# Patient Record
Sex: MALE | Race: WHITE | URBAN - METROPOLITAN AREA
[De-identification: names, ages, dates, MRNs, and addresses within clinical notes are randomized per-mention and may not be internally consistent; named-entity substitution may affect disease eponyms.]

---

## 2024-06-18 ENCOUNTER — TRANSFERRED RECORDS (OUTPATIENT)
Dept: HEALTH INFORMATION MANAGEMENT | Facility: CLINIC | Age: 74
End: 2024-06-18

## 2024-07-25 ENCOUNTER — TRANSFERRED RECORDS (OUTPATIENT)
Dept: HEALTH INFORMATION MANAGEMENT | Facility: CLINIC | Age: 74
End: 2024-07-25

## 2024-07-31 ENCOUNTER — TRANSFERRED RECORDS (OUTPATIENT)
Dept: HEALTH INFORMATION MANAGEMENT | Facility: CLINIC | Age: 74
End: 2024-07-31

## 2024-08-06 ENCOUNTER — TRANSFERRED RECORDS (OUTPATIENT)
Dept: HEALTH INFORMATION MANAGEMENT | Facility: CLINIC | Age: 74
End: 2024-08-06

## 2024-08-07 ENCOUNTER — TRANSCRIBE ORDERS (OUTPATIENT)
Dept: OTHER | Age: 74
End: 2024-08-07

## 2024-08-07 DIAGNOSIS — R91.8 ABNORMAL FINDING ON LUNG IMAGING: Primary | ICD-10-CM

## 2024-08-08 ENCOUNTER — TRANSFERRED RECORDS (OUTPATIENT)
Dept: HEALTH INFORMATION MANAGEMENT | Facility: CLINIC | Age: 74
End: 2024-08-08

## 2024-08-14 ENCOUNTER — PATIENT OUTREACH (OUTPATIENT)
Dept: ONCOLOGY | Facility: CLINIC | Age: 74
End: 2024-08-14

## 2024-08-14 ENCOUNTER — TRANSCRIBE ORDERS (OUTPATIENT)
Dept: OTHER | Age: 74
End: 2024-08-14

## 2024-08-14 ENCOUNTER — PRE VISIT (OUTPATIENT)
Dept: ONCOLOGY | Facility: CLINIC | Age: 74
End: 2024-08-14

## 2024-08-14 DIAGNOSIS — R91.8 ABNORMAL FINDING ON LUNG IMAGING: Primary | ICD-10-CM

## 2024-08-14 NOTE — PROGRESS NOTES
New IP (Interventional Pulmonology) referral rec'd.  Chart reviewed.       New Patient: Interventional Pulmonary (Lung nodule) Nurse Navigator Note    [facility]    Referred to (specialty): Interventional Pulmonary (Lung nodule)    Requested provider (if applicable): n/a    Date Referral Received: 8/14/2024    Evaluation for:  [facility] auth# [id number]       Clinical History (per Nurse review of records provided):    **BOOK MARKED**    7/31/2024:  [facility] and report tin media tab      7/25/2024:  PET (report found in media tab on page 30)          [facility]    RECORDS NEEDED:  Last FIVE years CHEST imaging [facility]--thank you!!    Additional testing needed prior to consult: NONE

## 2024-08-25 PROBLEM — R13.10 DYSPHAGIA: Status: ACTIVE | Noted: 2024-08-25

## 2024-08-25 PROBLEM — M25.562 PAIN IN LEFT KNEE: Status: ACTIVE | Noted: 2024-08-25

## 2024-08-25 PROBLEM — R91.8 OTHER NONSPECIFIC ABNORMAL FINDING OF LUNG FIELD: Status: ACTIVE | Noted: 2024-08-25

## 2024-08-25 PROBLEM — H61.21 IMPACTED CERUMEN, RIGHT EAR: Status: ACTIVE | Noted: 2024-08-25

## 2024-08-25 PROBLEM — S89.90XA KNEE INJURY: Status: ACTIVE | Noted: 2024-08-25

## 2024-08-25 PROBLEM — I71.43: Status: ACTIVE | Noted: 2024-08-25

## 2024-08-25 PROBLEM — L30.9 DERMATITIS, UNSPECIFIED: Status: ACTIVE | Noted: 2024-08-25

## 2024-08-25 PROBLEM — Z77.29 CONTACT WITH AND (SUSPECTED) EXPOSURE TO OTHER HAZARDOUS SUBSTANCES: Status: ACTIVE | Noted: 2024-08-25

## 2024-08-25 PROBLEM — F32.5 MAJOR DEPRESSIVE DISORDER WITH SINGLE EPISODE, IN FULL REMISSION (H): Status: ACTIVE | Noted: 2024-08-25

## 2024-08-25 PROBLEM — Q89.9 DEFORMITY: Status: ACTIVE | Noted: 2024-08-25

## 2024-08-25 PROBLEM — J44.1 CHRONIC OBSTRUCTIVE PULMONARY DISEASE WITH (ACUTE) EXACERBATION (H): Status: ACTIVE | Noted: 2024-08-25

## 2024-08-25 PROBLEM — R91.1 SOLITARY PULMONARY NODULE: Status: ACTIVE | Noted: 2024-08-25

## 2024-08-25 PROBLEM — J44.9 CHRONIC OBSTRUCTIVE LUNG DISEASE (H): Status: ACTIVE | Noted: 2024-08-25

## 2024-08-25 PROBLEM — C44.319 BASAL CELL CARCINOMA OF SKIN OF OTHER PARTS OF FACE: Status: ACTIVE | Noted: 2024-08-25

## 2024-08-25 PROBLEM — I71.40 ABDOMINAL AORTIC ANEURYSM (AAA) (H): Status: ACTIVE | Noted: 2024-08-25

## 2024-08-25 PROBLEM — D48.5 NEOPLASM OF UNCERTAIN BEHAVIOR OF SKIN: Status: ACTIVE | Noted: 2024-08-25

## 2024-08-25 PROBLEM — R60.9 EDEMA, UNSPECIFIED: Status: ACTIVE | Noted: 2024-08-25

## 2024-08-25 PROBLEM — H90.3 SENSORINEURAL HEARING LOSS, BILATERAL: Status: ACTIVE | Noted: 2024-08-25

## 2024-08-25 PROBLEM — N40.0 BENIGN LOCALIZED HYPERPLASIA OF PROSTATE: Status: ACTIVE | Noted: 2024-08-25

## 2024-08-25 PROBLEM — Z77.29 EXPOSURE TO POTENTIALLY HAZARDOUS SUBSTANCE: Status: ACTIVE | Noted: 2024-03-06

## 2024-08-25 PROBLEM — C44.02 SQUAMOUS CELL CARCINOMA OF LIP: Status: ACTIVE | Noted: 2024-08-25

## 2024-08-25 PROBLEM — I10 HYPERTENSION: Status: ACTIVE | Noted: 2024-08-25

## 2024-08-25 PROBLEM — Z86.16 HISTORY OF COVID-19: Status: ACTIVE | Noted: 2024-08-25

## 2024-08-25 PROBLEM — F32.9 MAJOR DEPRESSIVE DISORDER WITH SINGLE EPISODE: Status: ACTIVE | Noted: 2024-08-25

## 2024-08-25 PROBLEM — F10.10 ALCOHOL ABUSE: Status: ACTIVE | Noted: 2024-08-25

## 2024-08-25 PROBLEM — Z80.49 FAMILY HISTORY OF MALIGNANT NEOPLASM OF GENITAL ORGAN: Status: ACTIVE | Noted: 2024-08-25

## 2024-08-25 PROBLEM — F41.1 ANXIETY STATE: Status: ACTIVE | Noted: 2024-08-25

## 2024-08-25 PROBLEM — R91.8 MULTIPLE NODULES OF LUNG: Status: ACTIVE | Noted: 2024-08-25

## 2024-08-25 PROBLEM — E78.5 HLD (HYPERLIPIDEMIA): Status: ACTIVE | Noted: 2024-08-25

## 2024-08-25 PROBLEM — M17.12 UNILATERAL PRIMARY OSTEOARTHRITIS, LEFT KNEE: Status: ACTIVE | Noted: 2024-08-25

## 2024-08-25 PROBLEM — F32.A DEPRESSIVE DISORDER: Status: ACTIVE | Noted: 2024-08-25

## 2024-08-25 PROBLEM — M25.569 PAIN IN JOINT, LOWER LEG: Status: ACTIVE | Noted: 2024-08-25

## 2024-08-25 PROBLEM — K21.9 GASTROESOPHAGEAL REFLUX DISEASE: Status: ACTIVE | Noted: 2024-08-25

## 2024-08-25 PROBLEM — C44.310 BASAL CELL CARCINOMA (BCC) OF FACE: Status: ACTIVE | Noted: 2024-08-25

## 2024-08-25 PROBLEM — L82.0 INFLAMED SEBORRHEIC KERATOSIS: Status: ACTIVE | Noted: 2024-08-25

## 2024-08-25 PROBLEM — J44.9 CHRONIC OBSTRUCTIVE PULMONARY DISEASE, UNSPECIFIED (H): Status: ACTIVE | Noted: 2024-08-25

## 2024-08-25 RX ORDER — GUAIFENESIN 600 MG/1
600 TABLET, EXTENDED RELEASE ORAL
COMMUNITY
Start: 2023-11-09

## 2024-08-25 RX ORDER — CHLORTHALIDONE 25 MG/1
12.5 TABLET ORAL DAILY
COMMUNITY
Start: 2024-04-12

## 2024-08-25 RX ORDER — MAGNESIUM OXIDE 420 MG/1
420 TABLET ORAL
COMMUNITY
Start: 2024-02-13

## 2024-08-25 NOTE — PROGRESS NOTES
LUNG NODULE & INTERVENTIONAL PULMONARY CLINIC  [facility], [facility]     Sonny Carballo MRN# [id number]   Age: 73 year old YOB: 1950     Reason for Consultation: Pulmonary nodules    Requesting Physician: Referred MD Jim  No address on file     Assessment and Plan:    1. 6 x 12mm LLL nodule  Seen on 6/2024 CT, slowly growing in size. Was 2mm in 2022. 7/2024 PET scan with mild hypermetabolism.   Minimal metabolic activity seen in bilateral hilar lymph nodes. Discussed with patient the concern for malignancy given growth over time. Given small size of nodule, it will be difficult to biopsy.     Discussed with patient that we can attempt navigational biopsy to the nodule with EBUS, but there will still be concern for malignancy even with a negative biopsy. Patient is concerned about pneumothorax risk with biopsy and would like to discuss with [facility] first whether a biopsy is necessary vs referral to radiation oncology or surgery. He also has narrow mouth opening from prior radiation and not clear if an ETT can be easily placed. This will have to be assessed by anesthesia if biopsy is pursued.     --Will have my nurse call him in 2 weeks to determine if he's talked with [facility] pul[facility] and final plan for biopsy.     Bibiana Madison MD  Interventional Pulmonology  Department of Pulmonary, Allergy, Critical Care and Sleep Medicine   [facility]           History:     Sonny Carballo is a 73 year old male with sig h/o for COPD and HTN who is here for pulmonary nodules    Has history of COPD and uses Stiolto. Is short of breath walking 1/2 a mile. Can make it up a flight of stairs.     Had pneumothorax in 2021 of the left lung when he had COVID, treated with chest tube.     - Personal hx of cancer: basal cell carcinoma s/p MOHS surgery 2000 radiation, has narrow mouth opening.   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   - Tobacco hx: Quit smoking 2020, smoked for 45 years, 1ppd  - My interpretation of the images relevant for this visit includes: 6 x 12mm LLL nodule   - My interpretation of the PFT's relevant for this visit includes: Moderate airflow obstruction with moderate diffusion defect     7/31/24             Allergies:    No Known Allergies       Medications:     Current Outpatient Medications   Medication Sig Dispense Refill    atorvastatin (LIPITOR) 80 MG tablet Take 25 mg by mouth daily.      chlorthalidone (HYGROTON) 25 MG tablet Take 12.5 mg by mouth daily.      Magnesium Oxide 420 MG TABS Take 420 mg by mouth.      tiotropium-olodaterol 2.5-2.5 MCG/ACT AERS Inhale into the lungs.      [employer] GLUCOSAMINE-CHONDROITIN PO Take by mouth. (Patient not taking: Reported on 8/29/2024)      guaiFENesin (MUCINEX) 600 MG 12 hr tablet Take 600 mg by mouth. (Patient not taking: Reported on 8/29/2024)       No current facility-administered medications for this visit.            Physical Exam:   /80 (BP Location: Right arm, Patient Position: Sitting, Cuff Size: Adult Large)   Pulse 64   Temp 97.5  F (36.4  C) (Oral)   Resp 16   Ht 1.881 m (6' 2.06\")   Wt 89.9 kg (198 lb 3.2 oz)   SpO2 92%   BMI 25.41 kg/m    Wt Readings from Last 4 Encounters:   08/29/24 89.9 kg (198 lb 3.2 oz)     General: Well appearing, can only open his mouth about 2 finger breadths.   Lungs: Nonlabored breathing  Neuro: Answering questions appropriately  Psych: Normal affect     Imaging/Lab Data   All laboratory and imaging data reviewed.           "

## 2024-08-26 NOTE — PROGRESS NOTES
Pre-visit planning and chart review completed.     NEW patient appointment:    8/29 with Dr. Madison  6/18 CT chest wo contrast  7/25 PET  7/31 PFTs    - No anticoagulation.  - Former smoker.  - Referred by Dr. Aguilera at the [facility] [place].    CE updated. Medications, allergies, problem list, and immunizations reconciled.

## 2024-08-29 ENCOUNTER — ONCOLOGY VISIT (OUTPATIENT)
Dept: PULMONOLOGY | Facility: CLINIC | Age: 74
End: 2024-08-29
Attending: STUDENT IN AN ORGANIZED HEALTH CARE EDUCATION/TRAINING PROGRAM
Payer: COMMERCIAL

## 2024-08-29 VITALS
OXYGEN SATURATION: 92 % | WEIGHT: 198.2 LBS | HEIGHT: 74 IN | TEMPERATURE: 97.5 F | BODY MASS INDEX: 25.44 KG/M2 | SYSTOLIC BLOOD PRESSURE: 139 MMHG | RESPIRATION RATE: 16 BRPM | HEART RATE: 64 BPM | DIASTOLIC BLOOD PRESSURE: 80 MMHG

## 2024-08-29 DIAGNOSIS — R91.8 PULMONARY NODULES: Primary | ICD-10-CM

## 2024-08-29 PROCEDURE — 99213 OFFICE O/P EST LOW 20 MIN: CPT | Performed by: STUDENT IN AN ORGANIZED HEALTH CARE EDUCATION/TRAINING PROGRAM

## 2024-08-29 PROCEDURE — 99204 OFFICE O/P NEW MOD 45 MIN: CPT | Performed by: STUDENT IN AN ORGANIZED HEALTH CARE EDUCATION/TRAINING PROGRAM

## 2024-08-29 RX ORDER — ATORVASTATIN CALCIUM 80 MG/1
25 TABLET, FILM COATED ORAL DAILY
COMMUNITY

## 2024-08-29 ASSESSMENT — PAIN SCALES - GENERAL: PAINLEVEL: NO PAIN (0)

## 2024-08-29 NOTE — NURSING NOTE
"Oncology Rooming Note    August 29, 2024 8:23 AM   Sonny Carballo is a 73 year old male who presents for:    Chief Complaint   Patient presents with    Oncology Clinic Visit     Abnormal finding on lung imaging     Initial Vitals: /80 (BP Location: Right arm, Patient Position: Sitting, Cuff Size: Adult Large)   Pulse 64   Temp 97.5  F (36.4  C) (Oral)   Resp 16   Ht 1.881 m (6' 2.06\")   Wt 89.9 kg (198 lb 3.2 oz)   SpO2 92%   BMI 25.41 kg/m   Estimated body mass index is 25.41 kg/m  as calculated from the following:    Height as of this encounter: 1.881 m (6' 2.06\").    Weight as of this encounter: 89.9 kg (198 lb 3.2 oz). Body surface area is 2.17 meters squared.  No Pain (0) Comment: Data Unavailable   No LMP for male patient.  Allergies reviewed: Yes  Medications reviewed: Yes    Medications: Medication refills not needed today.  Pharmacy name entered into EPIC: Data Unavailable    Frailty Screening:   Is the patient here for a new oncology consult visit in cancer care? Unable to ask screening questions as provided requested to start the appointment.      Clinical concerns: none      Desire Cadena            "

## 2024-09-17 ENCOUNTER — TELEPHONE (OUTPATIENT)
Dept: PULMONOLOGY | Facility: CLINIC | Age: 74
End: 2024-09-17

## 2024-09-17 NOTE — TELEPHONE ENCOUNTER
Called and talked with patient per Dr. Madison's request to see if he talked with [facility] to [facility] biopsy.     Patient stated that he does not want to proceed.     Will let Dr. Madison know.